# Patient Record
Sex: FEMALE | Race: BLACK OR AFRICAN AMERICAN | URBAN - METROPOLITAN AREA
[De-identification: names, ages, dates, MRNs, and addresses within clinical notes are randomized per-mention and may not be internally consistent; named-entity substitution may affect disease eponyms.]

---

## 2017-01-27 ENCOUNTER — VIRTUAL VISIT (OUTPATIENT)
Dept: FAMILY MEDICINE | Facility: OTHER | Age: 27
End: 2017-01-27

## 2017-01-27 NOTE — PROGRESS NOTES
"Date:   Clinician: González Bunn  Clinician NPI: [id number]  Patient: Nuvia Mccurdy  Patient : 1990  Patient Address: [address] [place] [address]  Patient Phone: [phone]  Visit Protocol: UTI  Patient Summary:  Nuvia is a 26 year old ( : 1990 ) female who initiated a Zip for a presumed bladder infection. When asked the question \"Do you have a [facility] primary care physician?\", Nuvia responded \"No\".    Her symptoms began today and consist of urinary incontinence, urgency, urinary frequency, hematuria, hesitation, flank pain, abdominal pain, foul smelling urine, and dysuria.   Symptom Details     Urinary Frequency: Up to every 5 minutes     Flank Pain: pain present before UTI symptoms, denies tenderness     Abdominal Pain: Mild, is improving and right-lower quadrant     Hematuria: She has seen urine with occasional blood 1 time(s) since the onset of her symptoms. She is certain the blood is not from her vagina.      She denies vomiting, nausea, loss of appetite, chills, fever, vaginal discharge, and recent antibiotic use. Nuvia has never had kidney stones. She has not been hospitalized, been a patient in a nursing home, or had a catheter in the past two weeks. She denies risk factors for sexually transmitted infections.   Nuvia has not had any UTIs in the past 12 months. Her current symptoms are similar to the previous UTI symptoms. She took an antibiotic for her last infection but does not remember which one.    Nuvia typically gets yeast infections when she takes antibiotics.  She states she is not pregnant and denies breastfeeding. She has menstruated in the past month.   She does NOT smoke or use smokeless tobacco.   MEDICATIONS:  Fluoxetine (Prozac, Sarafem)  , ALLERGIES:  NKDA   Clinician Response:  Dear Nuvia,  Based on the information you have provided, you likely have a bladder infection, also called an acute urinary tract infection (UTI). The blood in " your urine appears when the infection causes irritation of the bladder or urine carrying structures.   To treat your infection, I am prescribing:   Nitrofurantoin (Macrobid). Swallow one (1) tablet twice a day for 5 days. Take the tablet with food. Continue taking the tablets even if you feel better before all the medication is gone. There is no refill with this prescription.   Some people develop allergies to antibiotics. If you notice a new rash, significant swelling, or difficulty breathing, stop the medication immediately and go into a clinic for physical evaluation.   To help treat your current UTI and prevent future occurrences, remember to:     Drink 8-10, 8-ounce glasses of water daily.    Urinate after sexual intercourse.    Wipe front to back after using the bathroom.     Some women may develop a yeast infection as a side effect of taking antibiotics. Because you noted that you typically get yeast infections when you are taking antibiotics, I am also prescribing:   Fluconazole (Diflucan) 150 mg oral tablet. Take this medication only if you notice symptoms of a yeast infection (vaginal discharge that is white, thick, and odorless). There are no refills with this prescription.   You should visit a clinic for a follow-up visit if your symptoms do not improve in 1-2 days or if you experience another urinary tract infection soon after completing this treatment.  If you become pregnant during this course of treatment, stop taking the medication and contact your primary care clinician.   Diagnosis: Acute Uncomplicated Bladder Infection  Diagnosis ICD: N39.0  Prescription: nitrofurantoin (Macrobid) 100mg oral tablet 10 tablets, 5 days supply. Take one tablet by mouth two times a day for 5 days. Refills: 0, Refill as needed: no, Allow substitutions: yes  Prescription: fluconazole (Diflucan) 150mg oral tablet 1 tablet, 1 days supply. Take one tablet by mouth one time a day for 1 day. Refills: 0, Refill as needed:  no, Allow substitutions: yes  Prescription Sent At: January 27 16:43:50, 2017  Pharmacy: [id number] - [phone] - [address], [place] [address]